# Patient Record
Sex: FEMALE | Race: BLACK OR AFRICAN AMERICAN | Employment: FULL TIME | URBAN - METROPOLITAN AREA
[De-identification: names, ages, dates, MRNs, and addresses within clinical notes are randomized per-mention and may not be internally consistent; named-entity substitution may affect disease eponyms.]

---

## 2019-05-22 ENCOUNTER — OFFICE VISIT (OUTPATIENT)
Dept: FAMILY MEDICINE CLINIC | Age: 19
End: 2019-05-22

## 2019-05-22 VITALS
TEMPERATURE: 97.5 F | WEIGHT: 133 LBS | HEIGHT: 64 IN | DIASTOLIC BLOOD PRESSURE: 72 MMHG | RESPIRATION RATE: 18 BRPM | SYSTOLIC BLOOD PRESSURE: 115 MMHG | OXYGEN SATURATION: 99 % | HEART RATE: 66 BPM | BODY MASS INDEX: 22.71 KG/M2

## 2019-05-22 DIAGNOSIS — G43.001 MIGRAINE WITHOUT AURA AND WITH STATUS MIGRAINOSUS, NOT INTRACTABLE: Primary | ICD-10-CM

## 2019-05-22 RX ORDER — AMITRIPTYLINE HYDROCHLORIDE 25 MG/1
25 TABLET, FILM COATED ORAL
Qty: 30 TAB | Refills: 0 | Status: SHIPPED | OUTPATIENT
Start: 2019-05-22

## 2019-05-22 NOTE — ACP (ADVANCE CARE PLANNING)
.====Heidi Diaz Invitation====    Patient was invited to [facility] on this date and given the information folder for review. Recommended appointment with Heidi Diaz facilitator for ACP conversation regarding advance directives. [] Yes  [x] No  Referral sent to [facility] team member or Coordinator for follow-up    [] Yes  [x] No  Patient scheduled an appointment.        Site of Referral:[facility]  Patient declined

## 2019-05-22 NOTE — PROGRESS NOTES
Chief Complaint   Patient presents with    Head Pain     NEW PATIENT x 3 weeks of intermittent headaches -throbbing pain 4 days in duration     Chief Complaint   Patient presents with    Head Pain     NEW PATIENT x 3 weeks of intermittent headaches -throbbing pain 4 days in duration     1. When and where did you last receive medical care? Yes When: 5/21/2019  DEPO shot [facility]  2. When and where did you last have preventive care such as mammogram, pap smears or colon screening? N?A MAMMO and colon screening. Angelica Rutherford PAP deferred until age 18    4. What is your current living situation (for example, live alone, live in home with immediate family members)? Lives alone    4. Do you have any problems with communication such trouble seeing, hearing, or understanding instructions? No    5. Do you have an advance directive? This is a document that you can give to family members with instructions for how you would want them to make health care decisions for you if you were unable to speak for yourself. (For example, unconscious, delerious)No    PMH/FH/Social Hx reviewed and updated as needed     Applicable screenings reviewed and updated as needed  Medication reconciliation performed. Patient does not know need medication refills. Health Maintenance reviewed.

## 2019-05-24 RX ORDER — PREDNISONE 1 MG/1
TABLET ORAL
Qty: 10 TAB | Refills: 0 | Status: SHIPPED | OUTPATIENT
Start: 2019-05-22 | End: 2019-05-25

## 2019-05-24 NOTE — PROGRESS NOTES
HISTORY OF PRESENT ILLNESS  Donaldo Blum is a 19 y.o. female here with a migraine. HPI Ms. Shamir Rosa reports having migraines that started 4 weeks ago. They run for about 4 days. Excedrine Migraine and Ibuprofen are effective but wear off after several hours. The pain is across the forehead and on both sides of the head. Bright lights make it worse. There is no N/V. Sometimes they go away by \"sleeping them off\". Denies changes with vision, denies dizziness and LOC. Denies seasonal allergies. She has had migraines in the past but they didn't last this long. She has been on Depo Provera for 1 year with injection compliance, last one this past week. Also, she reports having \"right eye surgery\" in her teens for \"pressure\" but not glaucoma. She is still followed by opthalmology and has her annual appt next week. Non-smoker. No chronic illnesses and does not take any medications. Review of Systems   Constitutional: Negative for chills, diaphoresis, fever, malaise/fatigue and weight loss. HENT: Negative for congestion, ear discharge, ear pain, hearing loss, nosebleeds, sinus pain and sore throat. Eyes: Negative for blurred vision, double vision, photophobia, pain, discharge and redness. Respiratory: Negative for cough, shortness of breath and wheezing. Cardiovascular: Negative for chest pain. Musculoskeletal: Negative for back pain, falls, joint pain and neck pain. Skin: Negative for rash. Neurological: Negative for dizziness, tingling, tremors, sensory change, speech change, focal weakness, seizures and weakness. Psychiatric/Behavioral: Negative for depression, memory loss and substance abuse. The patient is not nervous/anxious and does not have insomnia. Physical Exam    ASSESSMENT and PLAN    ICD-10-CM ICD-9-CM    1. Migraine without aura and with status migrainosus, not intractable G43.001 346.12    1. Prednisone taper:   Day 1, 4mg   Day 2, 3mg   Day 3, 2mg   Day 4, 1mg  2.  Amitriptyline 25mg QHS  3. F/U in 1 month for re-check  4. Discussed proper hydration, consider taking OTC loratadine x1 month as the elevated pollen counts maybe aggravating symptoms of today.   Ms. Hannah Goncalves agrees to plan

## 2024-07-29 ENCOUNTER — OFFICE VISIT (OUTPATIENT)
Dept: FAMILY MEDICINE CLINIC | Facility: CLINIC | Age: 24
End: 2024-07-29
Payer: COMMERCIAL

## 2024-07-29 VITALS
HEART RATE: 93 BPM | BODY MASS INDEX: 28.22 KG/M2 | SYSTOLIC BLOOD PRESSURE: 134 MMHG | OXYGEN SATURATION: 100 % | WEIGHT: 165.3 LBS | TEMPERATURE: 98.2 F | RESPIRATION RATE: 18 BRPM | DIASTOLIC BLOOD PRESSURE: 84 MMHG | HEIGHT: 64 IN

## 2024-07-29 DIAGNOSIS — H40.10X0 OPEN-ANGLE GLAUCOMA OF RIGHT EYE, UNSPECIFIED GLAUCOMA STAGE, UNSPECIFIED OPEN-ANGLE GLAUCOMA TYPE: ICD-10-CM

## 2024-07-29 DIAGNOSIS — D64.9 ANEMIA, UNSPECIFIED TYPE: ICD-10-CM

## 2024-07-29 DIAGNOSIS — E55.9 VITAMIN D DEFICIENCY: ICD-10-CM

## 2024-07-29 DIAGNOSIS — Z11.59 ENCOUNTER FOR HEPATITIS C SCREENING TEST FOR LOW RISK PATIENT: ICD-10-CM

## 2024-07-29 DIAGNOSIS — Z00.00 ANNUAL PHYSICAL EXAM: Primary | ICD-10-CM

## 2024-07-29 DIAGNOSIS — E66.3 OVERWEIGHT (BMI 25.0-29.9): ICD-10-CM

## 2024-07-29 PROBLEM — H40.9 GLAUCOMA: Status: ACTIVE | Noted: 2024-07-29

## 2024-07-29 PROCEDURE — 99385 PREV VISIT NEW AGE 18-39: CPT | Performed by: INTERNAL MEDICINE

## 2024-07-29 NOTE — ASSESSMENT & PLAN NOTE
Patient's (Body mass index is 28.37 kg/m².) indicates that they are overweight with health conditions that include none . Weight is newly identified. BMI is is above average; BMI management plan is completed. We discussed low calorie, low carb based diet program, portion control, and increasing exercise.

## 2024-07-29 NOTE — PROGRESS NOTES
Himanshu Wolfe is a 23 y.o. female who presents for annual female wellness exam.  Chief Complaint   Patient presents with    Establish Care     Here to establish care, no concerns today would like a referral to opthamology for glaucoma pressure checks    Patient is here today to establish care and for annual physical.  She has a history of glaucoma that resulted from prior trauma to the eye and would like referral to ophthalmology to get it checked out.  She is scheduled with OB/GYN for general women exam.  She is a has a history of anemia and vitamin D deficiency.  Was supposed to be on supplement but never took it.  Lab review from outside source indicates vitamin D level of <7 and hemoglobin of 11.1 with normal MCV.  Low creatinine level with normal BUN     ROS: Negative for all systems reviewed  Social history: No smoking, social alcohol use, no illicit drug use  Menstrual History: Regular  Pregnancy History: None  Sexual History: Not active  Contraception: None  Diet: Has been eating healthy  Exercise: 3 times a week  Last dental exam: A year ago  Eye exam: Last year      Mammogram: Not due for age  Pap Smear: Due today, appointment scheduled for OB/GYN in October  Bone Density: Not due for age  Colon Cancer Screening: Due for age      There is no immunization history on file for this patient.    The following portions of the patient's history were reviewed and updated as appropriate: allergies, current medications, past family history, past medical history, past social history, past surgical history and problem list.    Past Medical History:   Diagnosis Date    Glaucoma        Past Surgical History:   Procedure Laterality Date    EYE SURGERY         Family History   Problem Relation Age of Onset    No Known Problems Mother     No Known Problems Father     Cancer Maternal Grandmother     No Known Problems Maternal Grandfather     No Known Problems Paternal Grandmother     No Known Problems Paternal  Grandfather        Social History     Socioeconomic History    Marital status: Single   Tobacco Use    Smoking status: Never     Passive exposure: Never    Smokeless tobacco: Never   Vaping Use    Vaping status: Never Used         Objective   Vitals:    07/29/24 1509   BP: 134/84   Pulse: 93   Resp: 18   Temp: 98.2 °F (36.8 °C)   SpO2: 100%     Body mass index is 28.37 kg/m².  Physical Exam  Constitutional:       Appearance: Normal appearance.   HENT:      Head: Normocephalic and atraumatic.      Mouth/Throat:      Mouth: Mucous membranes are moist.   Eyes:      Conjunctiva/sclera: Conjunctivae normal.   Cardiovascular:      Rate and Rhythm: Normal rate and regular rhythm.      Heart sounds: Normal heart sounds.   Pulmonary:      Breath sounds: Normal breath sounds.   Abdominal:      General: Bowel sounds are normal.      Palpations: Abdomen is soft.   Neurological:      Mental Status: She is alert and oriented to person, place, and time.                    Assessment & Plan   Diagnoses and all orders for this visit:    1. Annual physical exam (Primary)  Assessment & Plan:  Discussed the importance of maintaining a healthy weight and getting regular exercise.  Educated patient on the benefits of healthy diet.  Advise follow-up annually for wellness exams.    Orders:  -     CBC Auto Differential  -     Basic Metabolic Panel  -     Lipid Panel With / Chol / HDL Ratio    2. Open-angle glaucoma of right eye, unspecified glaucoma stage, unspecified open-angle glaucoma type  -     Ambulatory Referral to Ophthalmology    3. Anemia, unspecified type  -     CBC Auto Differential  -     Iron Profile  -     Vitamin B12  -     Folate    4. Vitamin D deficiency  -     Vitamin D,25-Hydroxy    5. Encounter for hepatitis C screening test for low risk patient  -     Hepatitis C Antibody    6. Overweight (BMI 25.0-29.9)  Assessment & Plan:  Patient's (Body mass index is 28.37 kg/m².) indicates that they are overweight with health  conditions that include none . Weight is newly identified. BMI is is above average; BMI management plan is completed. We discussed low calorie, low carb based diet program, portion control, and increasing exercise.                         No follow-ups on file.

## 2024-07-30 ENCOUNTER — PATIENT ROUNDING (BHMG ONLY) (OUTPATIENT)
Dept: FAMILY MEDICINE CLINIC | Facility: CLINIC | Age: 24
End: 2024-07-30
Payer: COMMERCIAL

## 2024-07-30 LAB
25(OH)D3+25(OH)D2 SERPL-MCNC: 15.1 NG/ML (ref 30–100)
BASOPHILS # BLD AUTO: 0 X10E3/UL (ref 0–0.2)
BASOPHILS NFR BLD AUTO: 1 %
BUN SERPL-MCNC: 10 MG/DL (ref 6–20)
BUN/CREAT SERPL: 17 (ref 9–23)
CALCIUM SERPL-MCNC: 9.1 MG/DL (ref 8.7–10.2)
CHLORIDE SERPL-SCNC: 104 MMOL/L (ref 96–106)
CHOLEST SERPL-MCNC: 164 MG/DL (ref 100–199)
CHOLEST/HDLC SERPL: 3 RATIO (ref 0–4.4)
CO2 SERPL-SCNC: 25 MMOL/L (ref 20–29)
CREAT SERPL-MCNC: 0.58 MG/DL (ref 0.57–1)
EGFRCR SERPLBLD CKD-EPI 2021: 129 ML/MIN/1.73
EOSINOPHIL # BLD AUTO: 0.1 X10E3/UL (ref 0–0.4)
EOSINOPHIL NFR BLD AUTO: 1 %
ERYTHROCYTE [DISTWIDTH] IN BLOOD BY AUTOMATED COUNT: 12.8 % (ref 11.7–15.4)
GLUCOSE SERPL-MCNC: 93 MG/DL (ref 70–99)
HCT VFR BLD AUTO: 32 % (ref 34–46.6)
HDLC SERPL-MCNC: 55 MG/DL
HGB BLD-MCNC: 10 G/DL (ref 11.1–15.9)
IMM GRANULOCYTES # BLD AUTO: 0 X10E3/UL (ref 0–0.1)
IMM GRANULOCYTES NFR BLD AUTO: 0 %
LDLC SERPL CALC-MCNC: 101 MG/DL (ref 0–99)
LYMPHOCYTES # BLD AUTO: 2.9 X10E3/UL (ref 0.7–3.1)
LYMPHOCYTES NFR BLD AUTO: 52 %
MCH RBC QN AUTO: 26.7 PG (ref 26.6–33)
MCHC RBC AUTO-ENTMCNC: 31.3 G/DL (ref 31.5–35.7)
MCV RBC AUTO: 86 FL (ref 79–97)
MONOCYTES # BLD AUTO: 0.4 X10E3/UL (ref 0.1–0.9)
MONOCYTES NFR BLD AUTO: 7 %
NEUTROPHILS # BLD AUTO: 2.2 X10E3/UL (ref 1.4–7)
NEUTROPHILS NFR BLD AUTO: 39 %
PLATELET # BLD AUTO: 306 X10E3/UL (ref 150–450)
POTASSIUM SERPL-SCNC: 3.7 MMOL/L (ref 3.5–5.2)
RBC # BLD AUTO: 3.74 X10E6/UL (ref 3.77–5.28)
SODIUM SERPL-SCNC: 140 MMOL/L (ref 134–144)
TRIGL SERPL-MCNC: 38 MG/DL (ref 0–149)
VLDLC SERPL CALC-MCNC: 8 MG/DL (ref 5–40)
WBC # BLD AUTO: 5.6 X10E3/UL (ref 3.4–10.8)

## 2024-08-13 DIAGNOSIS — E55.9 VITAMIN D DEFICIENCY: Primary | ICD-10-CM

## 2024-08-13 RX ORDER — ERGOCALCIFEROL 1.25 MG/1
50000 CAPSULE ORAL WEEKLY
Qty: 13 CAPSULE | Refills: 0 | Status: SHIPPED | OUTPATIENT
Start: 2024-08-13

## 2025-03-19 ENCOUNTER — OFFICE VISIT (OUTPATIENT)
Dept: OBSTETRICS AND GYNECOLOGY | Age: 25
End: 2025-03-19

## 2025-03-19 VITALS
SYSTOLIC BLOOD PRESSURE: 120 MMHG | HEIGHT: 64 IN | BODY MASS INDEX: 28.44 KG/M2 | DIASTOLIC BLOOD PRESSURE: 84 MMHG | WEIGHT: 166.6 LBS

## 2025-03-19 DIAGNOSIS — Z12.4 SCREENING FOR CERVICAL CANCER: ICD-10-CM

## 2025-03-19 DIAGNOSIS — R82.90 ABNORMAL URINE ODOR: ICD-10-CM

## 2025-03-19 DIAGNOSIS — Z11.3 SCREENING FOR STDS (SEXUALLY TRANSMITTED DISEASES): ICD-10-CM

## 2025-03-19 DIAGNOSIS — Z01.419 ENCOUNTER FOR ANNUAL ROUTINE GYNECOLOGICAL EXAMINATION: Primary | ICD-10-CM

## 2025-03-19 NOTE — PROGRESS NOTES
[facility]   Obstetrics and Gynecology     3/19/2025    Patient: Marleny Wolfe          MR#:[id number]    History of Present Illness    Chief Complaint   Patient presents with    Gynecologic Exam     CC: new gyn. Last pap  (-).        24 y.o. female  who presents for annual exam. She states that she is having spotting with blood clots about 2 weeks after her periods for the last two months.     Relevant data reviewed:    Patient's last menstrual period was 2025 (exact date).  Obstetric History:  OB History          0    Para   0    Term   0       0    AB   0    Living   0         SAB   0    IAB   0    Ectopic   0    Molar   0    Multiple   0    Live Births   0               Menstrual History:     Patient's last menstrual period was 2025 (exact date).       Social History     Substance and Sexual Activity   Sexual Activity Yes    Partners: Male    Birth control/protection: None     ______________________________________  Patient Active Problem List   Diagnosis    Glaucoma    Annual physical exam    Vitamin D deficiency    Overweight (BMI 25.0-29.9)     Past Medical History:   Diagnosis Date    Glaucoma      Past Surgical History:   Procedure Laterality Date    EYE SURGERY       Social History     Tobacco Use   Smoking Status Never    Passive exposure: Never   Smokeless Tobacco Never     Family History   Problem Relation Age of Onset    No Known Problems Father     No Known Problems Mother     No Known Problems Paternal Grandfather     No Known Problems Paternal Grandmother     Cancer Maternal Grandmother     No Known Problems Maternal Grandfather      Prior to Admission medications    Medication Sig Start Date End Date Taking? Authorizing Provider   vitamin D (ERGOCALCIFEROL) 1.25 MG (77286 UT) capsule capsule Take 1 capsule by mouth 1 (One) Time Per Week.  Patient not taking: Reported on 3/19/2025 8/13/24   Allyson Montesinos MD  "    _______________________________________    Current contraception: none  History of abnormal Pap smear: no  Family history of uterine or ovarian cancer: no  Family History of colon cancer/colon polyps: no  History of abnormal mammogram:  not yet indicated      The following portions of the patient's history were reviewed and updated as appropriate: allergies, current medications, past family history, past medical history, past social history, past surgical history, and problem list.        Pertinent items are noted in HPI.       Objective   Physical Exam  Vitals and nursing note reviewed. Exam conducted with a chaperone present.   Constitutional:       Appearance: Normal appearance.   HENT:      Head: Normocephalic and atraumatic.   Pulmonary:      Effort: Pulmonary effort is normal.   Chest:   Breasts:     Right: Normal.      Left: Normal.   Abdominal:      General: Abdomen is flat.      Palpations: Abdomen is soft.   Genitourinary:     Exam position: Lithotomy position.      Labia:         Right: No rash or lesion.         Left: No rash or lesion.       Vagina: Normal. No vaginal discharge or lesions.      Cervix: Normal. No lesion.      Uterus: Normal. Not tender.       Adnexa: Right adnexa normal and left adnexa normal.        Right: No mass or tenderness.          Left: No mass or tenderness.     Lymphadenopathy:      Upper Body:      Right upper body: No supraclavicular, axillary or pectoral adenopathy.      Left upper body: No supraclavicular, axillary or pectoral adenopathy.   Skin:     General: Skin is warm and dry.   Neurological:      Mental Status: She is alert and oriented to person, place, and time.   Psychiatric:         Mood and Affect: Mood normal.         /84   Ht 162 cm (63.78\")   Wt 75.6 kg (166 lb 9.6 oz)   LMP 02/27/2025 (Exact Date)   BMI 28.79 kg/m²    BP Readings from Last 3 Encounters:   03/19/25 120/84   07/29/24 134/84      Wt Readings from Last 3 Encounters:   03/19/25 75.6 " "kg (166 lb 9.6 oz)   24 75 kg (165 lb 4.8 oz)        BMI: Estimated body mass index is 28.79 kg/m² as calculated from the following:    Height as of this encounter: 162 cm (63.78\").    Weight as of this encounter: 75.6 kg (166 lb 9.6 oz).    As part of wellness and prevention, the following topics were discussed with the patient:  Encouraged self breast exam  Sexual behavior/safe practices discussed.   Sexual transmitted disease prevention   Contraception discussed.   Dental health discussed  Vaccinations/immunizations addressed.           Problem List   Meds  History  Prep for Surg   Imagin}    Assessment:  24 y.o. female  who presents for annual exam.  Diagnoses and all orders for this visit:    1. Encounter for annual routine gynecological examination (Primary)    2. Screening for cervical cancer  -     IGP,CtNgTv,rfx Apt HPV All    3. Screening for STDs (sexually transmitted diseases)  -     IGP,CtNgTv,rfx Apt HPV All  -     Treponema pallidum AB w/Reflex RPR    4. Abnormal urine odor  -     Urinalysis With Microscopic - Urine, Clean Catch    - Pap collected   - Mammo/Colonoscopy not yet indicated  - Vaccine recs reviewed  - STI screening collected today    Patient will return for US for abnormal uterine bleeding and pelvic pain in between periods.       Plan:  No follow-ups on file.    Edwige Kimbrough MD  3/19/2025 08:33 EDT  "

## 2025-03-20 LAB
APPEARANCE UR: ABNORMAL
BACTERIA #/AREA URNS HPF: ABNORMAL /HPF
BILIRUB UR QL STRIP: NEGATIVE
CASTS URNS MICRO: ABNORMAL
COLOR UR: YELLOW
EPI CELLS #/AREA URNS HPF: ABNORMAL /HPF
GLUCOSE UR QL STRIP: NEGATIVE
HGB UR QL STRIP: NEGATIVE
KETONES UR QL STRIP: NEGATIVE
LEUKOCYTE ESTERASE UR QL STRIP: NEGATIVE
NITRITE UR QL STRIP: NEGATIVE
PH UR STRIP: 5.5 [PH] (ref 5–8)
PROT UR QL STRIP: ABNORMAL
RBC #/AREA URNS HPF: ABNORMAL /HPF
SP GR UR STRIP: >1.03 (ref 1–1.03)
TREPONEMA PALLIDUM IGG+IGM AB [PRESENCE] IN SERUM OR PLASMA BY IMMUNOASSAY: NON REACTIVE
UROBILINOGEN UR STRIP-MCNC: ABNORMAL MG/DL
WBC #/AREA URNS HPF: ABNORMAL /HPF

## 2025-03-22 LAB
C TRACH RRNA CVX QL NAA+PROBE: POSITIVE
CONV .: ABNORMAL
CYTOLOGIST CVX/VAG CYTO: ABNORMAL
CYTOLOGY CVX/VAG DOC CYTO: ABNORMAL
CYTOLOGY CVX/VAG DOC THIN PREP: ABNORMAL
DX ICD CODE: ABNORMAL
N GONORRHOEA RRNA CVX QL NAA+PROBE: NEGATIVE
OTHER STN SPEC: ABNORMAL
SERVICE CMNT-IMP: ABNORMAL
STAT OF ADQ CVX/VAG CYTO-IMP: ABNORMAL
T VAGINALIS RRNA SPEC QL NAA+PROBE: NEGATIVE

## 2025-04-16 ENCOUNTER — TELEPHONE (OUTPATIENT)
Dept: OBSTETRICS AND GYNECOLOGY | Age: 25
End: 2025-04-16

## 2025-04-16 NOTE — TELEPHONE ENCOUNTER
Caller: Marleny Wolfe    Relationship:  Self    Best call back number: [phone]    PATIENT CALLED REQUESTING TO CANCEL SAME DAY APPT.    Did the patient call AFTER the start time of their scheduled appointment?  []YES  [x]NO    Was the patient's appointment rescheduled? []YES  [x]NO    Any additional information: PATIENT SAID SHE WILL CALL BACK TO RESCHEDULE US AND GYN FOLLOW-UP